# Patient Record
Sex: FEMALE | Race: WHITE
[De-identification: names, ages, dates, MRNs, and addresses within clinical notes are randomized per-mention and may not be internally consistent; named-entity substitution may affect disease eponyms.]

---

## 2018-02-24 ENCOUNTER — HOSPITAL ENCOUNTER (EMERGENCY)
Dept: HOSPITAL 80 - FED | Age: 53
Discharge: HOME | End: 2018-02-24
Payer: COMMERCIAL

## 2018-02-24 VITALS
DIASTOLIC BLOOD PRESSURE: 66 MMHG | RESPIRATION RATE: 16 BRPM | SYSTOLIC BLOOD PRESSURE: 105 MMHG | HEART RATE: 64 BPM | OXYGEN SATURATION: 94 %

## 2018-02-24 VITALS — TEMPERATURE: 98.2 F
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
## 2018-02-24 DIAGNOSIS — S83.92XA: Primary | ICD-10-CM
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
## 2018-02-24 PROCEDURE — L1830 KO IMMOB CANVAS LONG PRE OTS: HCPCS

## 2018-02-24 NOTE — EDPHY
H & P


Time Seen by Provider: 02/24/18 12:05


HPI/ROS: 





CHIEF COMPLAINT:  Left knee injury





HISTORY OF PRESENT ILLNESS:  53-year-old female presents to the emergency 

department with injury to the left knee.  The patient was skiing yesterday 

around 3:00 p.m. And somehow twisted and fell injuring her left knee.  She is 

having pain and swelling in the left knee especially with range of motion.  

Denies any other trauma or injury.  Did not hit her head or lose consciousness.





ROS:  Denies numbness or tingling a in her left foot, feelings of weakness in 

her left lower leg.  Denies hitting her head or losing consciousness.


Past Medical/Surgical History: 





Meniere's disease, cyclical vomiting syndrome


Social History: 





 and lives in [place]


Smoking Status: Never smoked


Physical Exam: 





On examination, there is no bruising or abrasions noted.  She does have a large 

joint effusion noted especially in the suprapatellar region.  She has limited 

flexion and is unable to fully extend her left knee secondary to pain.  She has 

reproducible pain with palpation mostly in the lateral joint line as well as in 

the lateral aspect of the left knee.  No palpable crepitus or other bony 

abnormality.  Patellar apprehension sign is negative.  Calf is nontender.  

Achilles tendon is intact.  Normal sensation to light touch with normal 2 point 

discrimination.  Strong dorsalis pedis pulse on the dorsal aspect of her left 

foot.


Constitutional: 


 Initial Vital Signs











Temperature (C)  36.8 C   02/24/18 11:40


 


Heart Rate  84   02/24/18 11:40


 


Respiratory Rate  18   02/24/18 11:40


 


Blood Pressure  115/77   02/24/18 11:40


 


O2 Sat (%)  97   02/24/18 11:40








 











O2 Delivery Mode               Room Air














Allergies/Adverse Reactions: 


 





ibuprofen Allergy (Mild, Verified 02/24/18 11:51)


 GI upset








Home Medications: 














 Medication  Instructions  Recorded


 


LORAZEPAM 1 mg PO DAILY PRN 10/16/16


 


Ondansetron Odt [Zofran Odt 4 mg 4 mg PO Q4 PRN #10 tab 10/18/16





(RX)]  


 


traMADol [Ultram 50 mg (*)] 50 mg PO Q6 PRN #10 tab 02/24/18














MDM/Departure





- MDM


Imaging Results: 


 Imaging Impressions





Knee X-Ray  02/24/18 12:01


Impression: Large joint effusion with no visible acute osseous findings. If 

symptoms persist and clinical suspicion warrants, consider MRI.











Imaging: I viewed and interpreted images myself


Procedures: 





Patient was placed in a straight leg knee immobilizer and examined post 

application in good placement with normal CNS.


Medications Given: 


 








Discontinued Medications





Tramadol HCl (Ultram)  50 mg PO EDNOW ONE


   Stop: 02/24/18 13:24


   Last Admin: 02/24/18 13:29 Dose:  50 mg





ED Course/Re-evaluation: 





53-year-old female presents to the emergency department with left knee injury.  

X-rays reveal no fractures.  She has large joint effusion.  She understands she 

likely has internal derangement of the knee and will need follow up with 

orthopedic surgery for likely outpatient MRI of her left knee.  She was placed 

in a straight leg knee immobilizer and given orthopedic referral.





- Depart


Disposition: Home, Routine, Self-Care


Clinical Impression: 


Left knee sprain


Qualifiers:


 Encounter type: initial encounter Involved ligament of knee: unspecified 

ligament Qualified Code(s): S83.92XA - Sprain of unspecified site of left knee, 

initial encounter





Condition: Good


Instructions:  Knee Sprain (ED)


Additional Instructions: 


Knee immobilizer for comfort and support. Crutches, nonweight bearing.





Tramadol for severe pain as directed.





Return if you develop increasing pain or if you feel worse in any way.








Prescriptions: 


traMADol [Ultram 50 mg (*)] 50 mg PO Q6 PRN #10 tab


 PRN Reason: P.r.n. Pain


Referrals: 


Eliot Bunch MD [Medical Doctor] - 2-3 days without fail ([facility] orthopedic surgeon )

## 2018-04-12 ENCOUNTER — HOSPITAL ENCOUNTER (EMERGENCY)
Dept: HOSPITAL 80 - FED | Age: 53
Discharge: HOME | End: 2018-04-12
Payer: COMMERCIAL

## 2018-04-12 VITALS — DIASTOLIC BLOOD PRESSURE: 79 MMHG | SYSTOLIC BLOOD PRESSURE: 128 MMHG
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
## 2018-04-12 DIAGNOSIS — G89.18: Primary | ICD-10-CM

## 2018-04-12 DIAGNOSIS — T81.89XA: ICD-10-CM

## 2018-04-12 DIAGNOSIS — R60.0: ICD-10-CM

## 2018-04-12 NOTE — EDPHY
General


Time Seen by Provider: 04/12/18 11:26


Narrative: 





CHIEF COMPLAINT: 


Ft pain, concern for DVT





HISTORY OF PRESENT ILLNESS: 


Patient presents with complaint of DVT in the left leg because of left foot 

swelling.  This happened over the past 24 hr.  Moderately painful.  No fever.  

No calf pain.  Minimal pain left groin.  She is postop day 3 from a left ACL 

repair with Claudia Silva and Himanshu.  Uncomplicated with improving left knee pain.  

She has adhere to their postop recommendations.  She has taken 81 mg aspirin 

daily.  No trauma or injury.  No no bleeding disorders.  No previous venous 

thrombolic event.  No chest pain or shortness of breath.  She contacted their 

office they recommended she come to the emergency department to rule out DVT.  

No other associated complaints or modifying factors.





REVIEW OF SYSTEMS:


Ten systems reviewed and are negative unless otherwise noted in the HPI





SPECIALISTS:


Dr. Silva, Ortho


Dr. Downs, Ortho





PAST MEDICAL HISTORY: 


Uncomplicated





PAST SURGICAL HISTORY:


Left ACL repair on Monday April 9th 2018





SOCIAL HISTORY:


Nonsmoker.  Lives and works Here independently with her spouse 





FAMILY HISTORY:


noncontributory





EXAMINATION


General Appearance:  Alert, no distress


Head: normocephalic, atraumatic


Neck:  Normal inspection, supple, non-tender


Respiratory:  Lungs are clear to auscultation.  No wheeze, rhonchi or crackles


Cardiovascular:  Regular rate and rhythm.  No murmur.  Symmetric radial pulses 2

+.  Symmetric DP pulses 2+.


Neurological:  A&O, nonfocal, sensory symmetric in the dorsum of the feet and 

plantar surface of the feet.  Symmetric strength of the great toes.


Skin:  Warm and dry, no rash.  There are surgical incisions to the left knee 

that are clean, dry and intact.  No dehiscence.  No signs of infection


Extremities:  Moderate left lower extremity edema surrounding the knee.  

Minimal calf or pedal edema.  Range of motion of the ankles intact.  No pain 

with passive dorsiflexion of the ankle.


Psychiatric:  Mood and affect normal





DIFFERENTIAL DIAGNOSES:


Including but not limited to DVT, postoperative swelling, edema








MDM:


11:30 a.m.


Pain and swelling of the left foot postop day 3 from left ACL repair.  Her 

incisions are clean, dry and intact.  The foot is minimally swollen by my 

appreciation.  She does have pain only in the foot and distal ankle.  No pain 

in the calf.  No bleeding disorders or previous venous thrombolic event.  Given 

her recent surgery or concern, we have ordered ultrasound of the lower 

extremity for possible DVT.  Ultrasound is currently at bedside.





12:05 p.m.


Notified by radiologist Dr. Santos.  Negative DVT study of the left lower 

extremity.





12:10 p.m.


Patient re-evaluated.  I informed her of the negative DVT study.  We discussed 

postoperative pain and swelling.  We discussed contacting the surgeon for 

outpatient follow-up.  We discussed ED precautions for any worsening pain, 

swelling, redness.  She is comfortable this plan and discharged home stable 

condition.








SUPERVISION:


This patient was independently evaluated without direct involvement of or 

examination by the attending physician. 








- Diagnostics


Imaging Results: 


 Imaging Impressions





Extremity Venous Study  04/12/18 11:18


Impression:


 


No sonographic evidence of DVT in the left lower extremity.


 


Dr. Santos discussed these findings by telephone with Ugo Younger on 4/12/ 2018 at 1204 hours.


 














- History


Smoking Status: Never smoked





- Objective


Vital Signs: 


 Initial Vital Signs











Temperature (C)  97.3 F   04/12/18 11:18


 


Heart Rate  70   04/12/18 11:18


 


Respiratory Rate  16   04/12/18 11:18


 


Blood Pressure  128/79 H  04/12/18 11:18


 


O2 Sat (%)  98   04/12/18 11:18








 











O2 Delivery Mode               Room Air














Allergies/Adverse Reactions: 


 





ibuprofen Allergy (Mild, Verified 04/12/18 11:18)


 GI upset








Home Medications: 














 Medication  Instructions  Recorded


 


LORAZEPAM 1 mg PO DAILY PRN 10/16/16


 


Ondansetron Odt [Zofran Odt 4 mg 4 mg PO Q4 PRN #10 tab 10/18/16





(RX)]  


 


traMADol [Ultram 50 mg (*)] 50 mg PO Q6 PRN #10 tab 02/24/18


 


Aspirin 81mg (*)  04/12/18


 


Oxycodone HCl  04/12/18














Departure





- Departure


Disposition: Home, Routine, Self-Care


Clinical Impression: 


 Postoperative edema, Postoperative pain of left knee





Condition: Good


Instructions:  Operative Knee Arthroscopy (DC)


Additional Instructions: 


1. Continue your previous instructions From orthopedic surgeon 


2. Contact your orthopedic surgeon for outpatient follow-up


3. ED precautions for increasing pain, swelling, redness, fever


Referrals: 


Reji Barr MD [Primary Care Provider] - As per Instructions

## 2018-04-26 ENCOUNTER — HOSPITAL ENCOUNTER (EMERGENCY)
Dept: HOSPITAL 80 - FED | Age: 53
Discharge: HOME | End: 2018-04-26
Payer: COMMERCIAL

## 2018-04-26 VITALS — DIASTOLIC BLOOD PRESSURE: 83 MMHG | SYSTOLIC BLOOD PRESSURE: 136 MMHG

## 2018-04-26 DIAGNOSIS — R11.2: Primary | ICD-10-CM

## 2018-04-26 DIAGNOSIS — Z90.710: ICD-10-CM

## 2018-04-26 DIAGNOSIS — E86.9: ICD-10-CM

## 2018-04-26 NOTE — EDPHY
H & P


Stated Complaint: VOMITING AND DIARRHEA X 2 DAYS/HX MENIERES DX


Time Seen by Provider: 04/26/18 02:20


HPI/ROS: 





Chief Complaint:  Abdominal pain, nausea, vomiting, Meniere's disease





HPI:  53-year-old woman with a history of Meniere's disease and cyclic vomiting 

syndrome is presenting with 3 days of nausea and vomiting and upper abdominal 

pain similar to her prior presentations.  She has been unable to keep anything 

down.  Symptoms feel similar to her prior episodes.  She has had some 

increasing ringing in her ears recently as well.  No fevers or chills.  Some 

occasional diarrhea.  No coffee grounds or blood in her vomit.  No dark tarry 

stools or blood in her diarrhea.  No chest pain or shortness of breath.  She is 

mildly dizzy but no vertiginous symptoms.





ROS:  10 point Review of Systems is negative except as noted in the HPI.





PMH:  Cyclic vomiting syndrome, Meniere's disease





Social History: No smoking, no alcohol,  no recreational drug use





Family History: non-contributory





Physical Exam:


Gen: Awake, Alert, uncomfortable appearing


HEENT:  


     Nose: no rhinorrhea


     Eyes: PERRLA, EOMI


     Mouth: Moist mucosa 


Neck: Supple, no JVD


Chest: nontender, lungs clear to auscultation


Heart: S1, S2 normal, no murmur


Abd: Soft, non-tender, no guarding


Back: no CVA tenderness, no midline tenderness 


Ext: no edema, non-tender


Skin: no rash


Neuro: CN II-XII intact, Sensation grossly intact, Strength 5/5 in bilateral 

upper and lower extremities








- Personal History


LMP (Females 10-55): Hysterectomy


Current Tetanus Diphtheria and Acellular Pertussis (TDAP): Yes


Tetanus Vaccine Date: < 10 YEARS





- Medical/Surgical History


Hx Asthma: No


Hx Chronic Respiratory Disease: No


Hx Diabetes: No


Hx Cardiac Disease: No


Hx Renal Disease: No


Hx Cirrhosis: No


Hx Alcoholism: No


Hx HIV/AIDS: No


Hx Splenectomy or Spleen Trauma: No


Other PMH: CYCLIC VOMITING, HYSTERECTOMY.  Menieres





- Social History


Smoking Status: Never smoked


Constitutional: 


 Initial Vital Signs











Temperature (C)  36.8 C   04/26/18 01:39


 


Heart Rate  67   04/26/18 01:39


 


Respiratory Rate  18   04/26/18 01:39


 


Blood Pressure  142/69 H  04/26/18 01:39


 


O2 Sat (%)  98   04/26/18 01:39








 











O2 Delivery Mode               Nasal Cannula


 


O2 (L/minute)                  2














Allergies/Adverse Reactions: 


 





ibuprofen Allergy (Mild, Verified 04/12/18 11:18)


 GI upset








Home Medications: 














 Medication  Instructions  Recorded


 


Ativan  04/26/18


 


Phenergan 12.5mg tab  04/26/18


 


Zofran  04/26/18














Medical Decision Making


ED Course/Re-evaluation: 





Patient is improved after 2 L of IV fluid and Haldol.  She is now tolerating p. 

O..  Is ambulate without difficulty.  Abdomen is soft and benign.  Will 

discharge with follow-up as an outpatient.  





- Data Points


Medications Given: 


 








Discontinued Medications





Haloperidol Lactate (Haldol Injection)  5 mg IVP EDNOW ONE


   Stop: 04/26/18 02:29


   Last Admin: 04/26/18 02:39 Dose:  5 mg


Sodium Chloride (Ns)  1,000 mls @ 0 mls/hr IV ONCE ONE; Wide Open


   PRN Reason: Protocol


   Stop: 04/26/18 02:29


   Last Admin: 04/26/18 02:38 Dose:  1,000 mls


Sodium Chloride (Ns)  1,000 mls @ 0 mls/hr IV ONCE ONE


   PRN Reason: Wide Open


   Stop: 04/26/18 02:33


   Last Admin: 04/26/18 02:39 Dose:  1,000 mls








Departure





- Departure


Disposition: Home, Routine, Self-Care


Clinical Impression: 


 Nausea & vomiting





Condition: Good


Instructions:  Acute Nausea and Vomiting (ED)


Additional Instructions: 


Follow up with primary care physician in 2-3 days for further evaluation.


Return to the emergency department for worsening pain, uncontrolled nausea 

vomiting, fevers, or any other concerns.


Referrals: 


Reji Barr MD [Primary Care Provider] - As per Instructions

## 2018-11-09 ENCOUNTER — HOSPITAL ENCOUNTER (OUTPATIENT)
Dept: HOSPITAL 80 - FIMAGING | Age: 53
End: 2018-11-09
Attending: INTERNAL MEDICINE
Payer: COMMERCIAL

## 2018-11-09 DIAGNOSIS — Z12.31: Primary | ICD-10-CM

## 2018-11-09 DIAGNOSIS — Z80.3: ICD-10-CM
